# Patient Record
Sex: MALE | Race: WHITE
[De-identification: names, ages, dates, MRNs, and addresses within clinical notes are randomized per-mention and may not be internally consistent; named-entity substitution may affect disease eponyms.]

---

## 2019-08-01 ENCOUNTER — HOSPITAL ENCOUNTER (EMERGENCY)
Dept: HOSPITAL 59 - ER | Age: 49
Discharge: HOME | End: 2019-08-01
Payer: COMMERCIAL

## 2019-08-01 DIAGNOSIS — R31.0: ICD-10-CM

## 2019-08-01 DIAGNOSIS — N34.2: Primary | ICD-10-CM

## 2019-08-01 DIAGNOSIS — R30.0: ICD-10-CM

## 2019-08-01 DIAGNOSIS — F17.210: ICD-10-CM

## 2019-08-01 LAB
ABSOLUTE NEUTROPHIL COUNT: 4.81
ANION GAP SERPL CALC-SCNC: 10 MMOL/L (ref 7–16)
APPEARANCE UR: CLEAR
BASOPHILS NFR BLD: 0.5 % (ref 0–6)
BILIRUB UR-MCNC: NEGATIVE MG/DL
BUN SERPL-MCNC: 11 MG/DL (ref 6–20)
CO2 SERPL-SCNC: 27 MMOL/L (ref 22–29)
COLOR UR: YELLOW
CREAT SERPL-MCNC: 0.8 MG/DL (ref 0.7–1.2)
EOSINOPHIL NFR BLD: 2.1 % (ref 0–6)
ERYTHROCYTE [DISTWIDTH] IN BLOOD BY AUTOMATED COUNT: 13 % (ref 11.5–14.5)
EST GLOMERULAR FILTRATION RATE: > 60 ML/MIN
GLUCOSE SERPL-MCNC: 203 MG/DL (ref 74–109)
GLUCOSE UR STRIP-MCNC: NEGATIVE MG/DL
GRANULOCYTES NFR BLD: 59.2 % (ref 47–80)
HCT VFR BLD CALC: 43.8 % (ref 42–52)
HGB BLD-MCNC: 15.4 GM/DL (ref 14–18)
KETONES UR QL STRIP: NEGATIVE
LYMPHOCYTES NFR BLD AUTO: 31.2 % (ref 16–45)
MCH RBC QN AUTO: 33.3 PG (ref 27–33)
MCHC RBC AUTO-ENTMCNC: 35.2 G/DL (ref 32–36)
MCV RBC AUTO: 94.6 FL (ref 81–97)
MONOCYTES NFR BLD: 7 % (ref 0–9)
NITRITE UR QL STRIP: NEGATIVE
PLATELET # BLD: 187 K/UL (ref 130–400)
PMV BLD AUTO: 10.1 FL (ref 7.4–10.4)
PROT UR QL STRIP: NEGATIVE
RBC # BLD AUTO: 4.63 M/UL (ref 4.4–5.7)
RBC # UR STRIP: NEGATIVE /UL
URINE LEUKOCYTE ESTERASE: NEGATIVE
UROBILINOGEN UR STRIP-ACNC: 0.2 E.U./DL (ref 0.2–1)
WBC # BLD AUTO: 8.1 K/UL (ref 4.2–12.2)

## 2019-08-01 PROCEDURE — 99283 EMERGENCY DEPT VISIT LOW MDM: CPT

## 2019-08-01 PROCEDURE — 81003 URINALYSIS AUTO W/O SCOPE: CPT

## 2019-08-01 PROCEDURE — 80048 BASIC METABOLIC PNL TOTAL CA: CPT

## 2019-08-01 PROCEDURE — 85025 COMPLETE CBC W/AUTO DIFF WBC: CPT

## 2019-08-01 NOTE — EMERGENCY DEPARTMENT RECORD
History of Present Illness





- General


Chief complaint: Flank Pain


Stated complaint: POSSIBLE UTI,ABD PAIN LOWER RIGHT SIDE


Time Seen by Provider: 19 15:30


Source: Patient


Mode of Arrival: Ambulatory


Limitations: No limitations





- History of Present Illness


Initial comments: 


The patient is here due to a one month hx of dysuria and mild urethral discha

rge. The symptoms seem to be getting progressively worse and the patient does 

have an appointment with a Urologist tomorrow in [place]. He does intermittently

have mild R flank pain but mainly only when urinating. There has been no nausea,

vomiting, fever, back pain or any groin swelling. The patient does have a hx of 

a urethral stricture in the remote past and did need an operation on it at age 4

5.





MD Complaint: Dysuria


Onset/Timin


-: Month(s)


Severity scale (1-10): 5


Quality: Sharp


Improves with: None


Worsens with: None


Reports: Blood in urine





- Related Data


                                  Previous Rx's











 Medication  Instructions  Recorded


 


Ciprofloxacin HCl [Cipro] 500 mg PO Q12HR #14 tablet 19











                                    Allergies











Allergy/AdvReac Type Severity Reaction Status Date / Time


 


No Known Drug Allergies Allergy   Verified 19 15:32














Travel Screening





- Travel/Exposure Within Last 30 Days


Have you traveled within the last 30 days?: No





- Travel/Exposure Within Last Year


Have you traveled outside the [place] in the last year?: No





- Additonal Travel Details


Have you been exposed to anyone with a communicable illness?: No





- Travel Symptoms


Symptom Screening: None





Review of Systems


Constitutional: Denies: Chills, Fever


Eyes: Denies: Eye discharge


ENT: Denies: Congestion


Respiratory: Denies: Cough, Dyspnea





Past Medical History





- SOCIAL HISTORY


Smoking Status: Current every day smoker


Alcohol Use: Heavy


Alcohol Use Comment: 6-8 beers daily


Drug Use: None





- RESPIRATORY


Hx Respiratory Disorders: Yes


Hx Pneumonia: Yes





- CARDIOVASCULAR


Hx Cardio Disorders: No





- NEURO


Hx Neuro Disorders: No





- GI


Hx GI Disorders: No





- 


Hx Genitourinary Disorders: No


Comment:: penile surgery as child





- ENDOCRINE


Hx Endocrine Disorders: No





- MUSCULOSKELETAL


Hx Musculoskeletal Disorders: No





- PSYCH


Hx Psych Problems: No





- HEMATOLOGY/ONCOLOGY


Hx Hematology/Oncology Disorders: No





Family Medical History


Any Significant Family History?: No





Physical Exam





- General


General Appearance: Alert, Oriented x3, Cooperative, No acute distress





- Head


Head exam: Atraumatic, Normocephalic, Normal inspection





- Eye


Eye exam: Normal appearance





- Neck


Neck exam: Normal inspection, Full ROM.  negative: Tenderness





- Respiratory


Respiratory exam: Normal lung sounds bilaterally.  negative: Respiratory 

distress





- Cardiovascular


Cardiovascular Exam: Regular rate, Normal rhythm, Normal heart sounds





- GI/Abdominal


GI/Abdominal exam: Soft, Normal bowel sounds.  negative: Rebound, Rigid, 

Tenderness





- 


 exam: Circumcision, Urethral discharge (with mild inflamation and tenderness 

at the urethral tip.)





Course





                                   Vital Signs











  19





  15:25


 


Temperature 98.0 F


 


Pulse Rate 100 H


 


Respiratory 20





Rate 


 


Blood Pressure 164/102


 


Pulse Ox 96














- Reevaluation(s)


Reevaluation #1: 


I did discuss the need for an oral Abx with the patient. We will place the 

patient on Cipro and will instruct to see the Urologist tomorrow. Due to the 

patient's hx of urethral strictures I did elect to treat with the Cipro.


19 16:31














Medical Decision Making





- Data Complexity


MDM Data: Labs Ordered and/or Reviewed





- Lab Data


Result diagrams: 


                                 19 15:50





                                 19 15:50





Disposition


Disposition: Discharge


Clinical Impression: 


 Urethritis





Disposition: Home, Self-Care


Condition: (2) Stable


Instructions:  Dysuria (ED)


Additional Instructions: 


Please take the Cipro as directed and please see your Urologist tomorrow as 

planned. Return to the ER for any worsening symptoms, pain, fever, or vomiting. 

Please also see your family doctor due to your elevated glucose.


Prescriptions: 


Ciprofloxacin HCl [Cipro] 500 mg PO Q12HR #14 tablet


Forms:  Patient Portal Access


Time of Disposition: 16:34





Quality





- Quality Measures


Quality Measures: N/A





- Blood Pressure Screening


View Details: Yes


Does Patient Have Any of the Following: No


Blood Pressure Classification: Hypertensive Reading


Systolic Measurement: 164


Diastolic Measurement: 102


Screening for High Blood Pressure: < First Hypertensive BP, F/U Documented > 

[]


First Hypertensive Follow-up Interventions: Referral to alternative/primary care

 provider.